# Patient Record
(demographics unavailable — no encounter records)

---

## 2025-07-06 NOTE — HISTORY OF PRESENT ILLNESS
[FreeTextEntry1] : Patient is a 29yo  at 4w2d by LMP 6/3 presenting for repeat bHCG in the setting of PUL.  Pt initially presented to [facility] ED  with nausea/vomiting and inability to tolerate PO. Was found to be pregnant with bHCG 727.8, TVUS showing no IUP or suspicious adnexal masses. Pt kept for overnight observation, IV hydration and antiemetics, then discharged to home with Diclegis, Pepcid and Zofran PRN. Today pt reports mild central suprapubic cramping. Denies severe pelvic pain, vaginal bleeding. Reports nausea has improved. This is an unplanned but highly desired pregnancy.  OB hx: Med TOP x1 GYN hx: abn pap s/p normal colposcopy, h/o Chlamydia in  treated, negative JOSH PMH: denies Meds: PNV All: NKDA PShx: wisdom teeth extraction

## 2025-07-06 NOTE — PLAN
[FreeTextEntry1] : Patient is a 29yo  at 4w2d by LMP 6/3 presenting for repeat bHCG in the setting of PUL. Patient with appropriately uptrending bHC.8 () -> 1585 (7/3). TVUS with no visualized IUP though no suspicious adnexal masses. Differential dx includes most likely early IUP vs ectopic pregnancy.  - Strict ectopic precautions discussed - Pt will follow up in ED on Saturday, , for repeat bHCG and TVUS. Pt reports she will be working that day in [place] and may opt to present to [facility] for workup - GYN team will continue to follow  d/w attending, Dr. Darwin Frausto PGY4

## 2025-07-06 NOTE — PHYSICAL EXAM
[MA] : MA [Appropriately responsive] : appropriately responsive [Alert] : alert [No Acute Distress] : no acute distress [Soft] : soft [Non-tender] : non-tender [Non-distended] : non-distended [Oriented x3] : oriented x3 [FreeTextEntry2] : Sandy [FreeTextEntry4] : clinically well perfused [FreeTextEntry5] : normal respiratory effort on room air

## 2025-07-06 NOTE — PROCEDURE
[R/O Ectopic Pregnancy] : rule out ectopic pregnancy [Transvaginal Ultrasound] : transvaginal ultrasound [FreeTextEntry3] : No visualized IUP

## 2025-07-06 NOTE — PLAN
[FreeTextEntry1] : Patient is a 29yo  at 4w2d by LMP 6/3 presenting for repeat bHCG in the setting of PUL. Patient with appropriately uptrending bHC.8 () -> 1585 (7/3). TVUS with no visualized IUP though no suspicious adnexal masses. Differential dx includes most likely early IUP vs ectopic pregnancy.  - Strict ectopic precautions discussed - Pt will follow up in ED on Saturday, , for repeat bHCG and TVUS. Pt reports she will be working that day in [place] and may opt to present to [facility] for workup - GYN team will continue to follow  d/w attending, Dr. Darwin Frausto PGY4

## 2025-07-06 NOTE — HISTORY OF PRESENT ILLNESS
[FreeTextEntry1] : Patient is a 31yo  at 4w2d by LMP 6/3 presenting for repeat bHCG in the setting of PUL.  Pt initially presented to [facility] ED  with nausea/vomiting and inability to tolerate PO. Was found to be pregnant with bHCG 727.8, TVUS showing no IUP or suspicious adnexal masses. Pt kept for overnight observation, IV hydration and antiemetics, then discharged to home with Diclegis, Pepcid and Zofran PRN. Today pt reports mild central suprapubic cramping. Denies severe pelvic pain, vaginal bleeding. Reports nausea has improved. This is an unplanned but highly desired pregnancy.  OB hx: Med TOP x1 GYN hx: abn pap s/p normal colposcopy, h/o Chlamydia in  treated, negative JOSH PMH: denies Meds: PNV All: NKDA PShx: wisdom teeth extraction